# Patient Record
Sex: FEMALE | URBAN - NONMETROPOLITAN AREA
[De-identification: names, ages, dates, MRNs, and addresses within clinical notes are randomized per-mention and may not be internally consistent; named-entity substitution may affect disease eponyms.]

---

## 2018-04-23 ENCOUNTER — APPOINTMENT (RX ONLY)
Dept: URBAN - NONMETROPOLITAN AREA CLINIC 31 | Facility: CLINIC | Age: 38
Setting detail: DERMATOLOGY
End: 2018-04-23

## 2018-04-23 DIAGNOSIS — L40.0 PSORIASIS VULGARIS: ICD-10-CM | Status: WELL CONTROLLED

## 2018-04-23 DIAGNOSIS — D22 MELANOCYTIC NEVI: ICD-10-CM

## 2018-04-23 PROBLEM — D22.9 MELANOCYTIC NEVI, UNSPECIFIED: Status: ACTIVE | Noted: 2018-04-23

## 2018-04-23 PROCEDURE — ? ORDER TESTS

## 2018-04-23 PROCEDURE — 99213 OFFICE O/P EST LOW 20 MIN: CPT

## 2018-04-23 PROCEDURE — ? ADDITIONAL NOTES

## 2018-04-23 PROCEDURE — ? COUNSELING

## 2018-06-07 ENCOUNTER — APPOINTMENT (RX ONLY)
Dept: URBAN - NONMETROPOLITAN AREA CLINIC 31 | Facility: CLINIC | Age: 38
Setting detail: DERMATOLOGY
End: 2018-06-07

## 2018-06-07 ENCOUNTER — RX ONLY (OUTPATIENT)
Age: 38
Setting detail: RX ONLY
End: 2018-06-07

## 2018-06-07 RX ORDER — ADALIMUMAB 40MG/0.8ML
KIT SUBCUTANEOUS
Qty: 1 | Refills: 2 | Status: ERX

## 2018-09-25 ENCOUNTER — APPOINTMENT (RX ONLY)
Dept: URBAN - NONMETROPOLITAN AREA CLINIC 31 | Facility: CLINIC | Age: 38
Setting detail: DERMATOLOGY
End: 2018-09-25

## 2018-09-25 DIAGNOSIS — L40.0 PSORIASIS VULGARIS: ICD-10-CM | Status: STABLE

## 2018-09-25 PROCEDURE — ? ADDITIONAL NOTES

## 2018-09-25 PROCEDURE — ? PRESCRIPTION

## 2018-09-25 RX ORDER — ADALIMUMAB 40MG/0.8ML
KIT SUBCUTANEOUS
Qty: 1 | Refills: 2 | Status: ERX | COMMUNITY
Start: 2018-09-25

## 2018-09-25 RX ADMIN — ADALIMUMAB: KIT at 16:21

## 2018-09-25 NOTE — PROCEDURE: ADDITIONAL NOTES
Detail Level: Simple
Additional Notes: Labs drawn 9/21/2018 scanned, okay to refill Humira, fu 3 mos

## 2019-01-17 ENCOUNTER — APPOINTMENT (RX ONLY)
Dept: URBAN - NONMETROPOLITAN AREA CLINIC 31 | Facility: CLINIC | Age: 39
Setting detail: DERMATOLOGY
End: 2019-01-17

## 2019-01-17 DIAGNOSIS — L40.0 PSORIASIS VULGARIS: ICD-10-CM | Status: WELL CONTROLLED

## 2019-01-17 DIAGNOSIS — R21 RASH AND OTHER NONSPECIFIC SKIN ERUPTION: ICD-10-CM

## 2019-01-17 PROCEDURE — ? PRESCRIPTION

## 2019-01-17 PROCEDURE — ? OTHER

## 2019-01-17 PROCEDURE — ? ORDER TESTS

## 2019-01-17 PROCEDURE — 99212 OFFICE O/P EST SF 10 MIN: CPT

## 2019-01-17 RX ORDER — ADALIMUMAB 40MG/0.8ML
KIT SUBCUTANEOUS Q2WEEKS
Qty: 3 | Refills: 0 | Status: ERX

## 2019-01-17 NOTE — PROCEDURE: OTHER
Detail Level: Zone
Other (Free Text): WELL CONTROLLED WITH HUMIRA\\nCONT INJ'S AS DIRECTED\\n
Note Text (......Xxx Chief Complaint.): This diagnosis correlates with the

## 2019-06-05 ENCOUNTER — APPOINTMENT (RX ONLY)
Dept: URBAN - NONMETROPOLITAN AREA CLINIC 31 | Facility: CLINIC | Age: 39
Setting detail: DERMATOLOGY
End: 2019-06-05

## 2019-06-05 ENCOUNTER — RX ONLY (OUTPATIENT)
Age: 39
Setting detail: RX ONLY
End: 2019-06-05

## 2019-06-05 DIAGNOSIS — R21 RASH AND OTHER NONSPECIFIC SKIN ERUPTION: ICD-10-CM

## 2019-06-05 DIAGNOSIS — L40.0 PSORIASIS VULGARIS: ICD-10-CM

## 2019-06-05 PROCEDURE — ? ORDER TESTS

## 2019-06-05 PROCEDURE — ? PRESCRIPTION

## 2019-06-05 PROCEDURE — 99213 OFFICE O/P EST LOW 20 MIN: CPT

## 2019-06-05 RX ORDER — BETAMETHASONE DIPROPIONATE 0.5 MG/G
OINTMENT TOPICAL
Qty: 2 | Refills: 3 | Status: ERX | COMMUNITY
Start: 2019-06-05

## 2019-06-05 RX ORDER — ADALIMUMAB 40MG/0.8ML
KIT SUBCUTANEOUS Q2WEEKS
Qty: 1 | Refills: 2 | Status: ERX

## 2019-06-05 RX ORDER — ADALIMUMAB 40MG/0.8ML
KIT SUBCUTANEOUS
Qty: 1 | Refills: 2 | Status: ERX

## 2019-06-05 RX ADMIN — BETAMETHASONE DIPROPIONATE: 0.5 OINTMENT TOPICAL at 15:35

## 2019-12-17 ENCOUNTER — APPOINTMENT (RX ONLY)
Dept: URBAN - NONMETROPOLITAN AREA CLINIC 31 | Facility: CLINIC | Age: 39
Setting detail: DERMATOLOGY
End: 2019-12-17

## 2019-12-17 DIAGNOSIS — L40.0 PSORIASIS VULGARIS: ICD-10-CM

## 2019-12-17 PROCEDURE — ? COUNSELING

## 2019-12-17 PROCEDURE — ? ORDER TESTS

## 2019-12-17 PROCEDURE — 99212 OFFICE O/P EST SF 10 MIN: CPT

## 2019-12-17 PROCEDURE — ? PRESCRIPTION

## 2019-12-17 RX ORDER — ADALIMUMAB 40MG/0.8ML
KIT SUBCUTANEOUS Q2WEEKS
Qty: 1 | Refills: 3 | Status: ERX

## 2019-12-17 RX ORDER — USTEKINUMAB 45 MG/.5ML
INJECTION, SOLUTION SUBCUTANEOUS
Qty: 1 | Refills: 4 | Status: ERX | COMMUNITY
Start: 2019-12-17

## 2019-12-17 RX ADMIN — USTEKINUMAB: 45 INJECTION, SOLUTION SUBCUTANEOUS at 00:00

## 2019-12-17 ASSESSMENT — LOCATION ZONE DERM: LOCATION ZONE: LEG

## 2019-12-17 ASSESSMENT — LOCATION DETAILED DESCRIPTION DERM: LOCATION DETAILED: LEFT PROXIMAL PRETIBIAL REGION

## 2019-12-17 ASSESSMENT — LOCATION SIMPLE DESCRIPTION DERM: LOCATION SIMPLE: LEFT PRETIBIAL REGION

## 2020-02-17 RX ORDER — ADALIMUMAB 40MG/0.8ML
KIT SUBCUTANEOUS Q2WEEKS
Qty: 1 | Refills: 3 | Status: ERX

## 2020-02-25 ENCOUNTER — APPOINTMENT (RX ONLY)
Dept: URBAN - NONMETROPOLITAN AREA CLINIC 31 | Facility: CLINIC | Age: 40
Setting detail: DERMATOLOGY
End: 2020-02-25

## 2020-02-25 DIAGNOSIS — L40.0 PSORIASIS VULGARIS: ICD-10-CM

## 2020-02-25 PROCEDURE — ? PRESCRIPTION

## 2020-02-25 RX ORDER — USTEKINUMAB 45 MG/.5ML
INJECTION, SOLUTION SUBCUTANEOUS
Qty: 2 | Refills: 0 | Status: ERX

## 2020-07-17 RX ORDER — ADALIMUMAB 40MG/0.8ML
KIT SUBCUTANEOUS Q2WEEKS
Qty: 1 | Refills: 3 | Status: CANCELLED

## 2020-09-09 ENCOUNTER — APPOINTMENT (RX ONLY)
Dept: URBAN - NONMETROPOLITAN AREA CLINIC 31 | Facility: CLINIC | Age: 40
Setting detail: DERMATOLOGY
End: 2020-09-09

## 2020-09-09 DIAGNOSIS — L40.0 PSORIASIS VULGARIS: ICD-10-CM

## 2020-09-09 PROCEDURE — 99213 OFFICE O/P EST LOW 20 MIN: CPT

## 2020-09-09 PROCEDURE — ? ADDITIONAL NOTES

## 2020-09-09 PROCEDURE — ? PRESCRIPTION

## 2020-09-09 RX ORDER — ADALIMUMAB 40MG/0.8ML
KIT SUBCUTANEOUS Q2WEEKS
Qty: 1 | Refills: 3 | Status: ERX

## 2020-10-16 RX ORDER — ADALIMUMAB 40MG/0.8ML
KIT SUBCUTANEOUS Q2WEEKS
Qty: 1 | Refills: 3 | Status: ERX

## 2021-05-12 RX ORDER — ADALIMUMAB 40MG/0.8ML
KIT SUBCUTANEOUS Q2WEEKS
Qty: 1 | Refills: 3 | Status: ERX

## 2024-09-26 NOTE — PROCEDURE: ORDER TESTS
Incoming call from patient asking about new patient appointments. Advised patient that referral is needed & explained scheduling process. Patient would like to be seen for weight loss, menopause/hormonal issues & osteoarthritis.  Will send for review once received.   Performing Laboratory: [id number] Performing Laboratory: [id number]